# Patient Record
Sex: FEMALE | URBAN - METROPOLITAN AREA
[De-identification: names, ages, dates, MRNs, and addresses within clinical notes are randomized per-mention and may not be internally consistent; named-entity substitution may affect disease eponyms.]

---

## 2017-01-17 ENCOUNTER — EMERGENCY (EMERGENCY)
Facility: HOSPITAL | Age: 37
LOS: 1 days | Discharge: HOME | End: 2017-01-17
Attending: EMERGENCY MEDICINE | Admitting: EMERGENCY MEDICINE
Payer: COMMERCIAL

## 2017-01-17 VITALS
DIASTOLIC BLOOD PRESSURE: 102 MMHG | SYSTOLIC BLOOD PRESSURE: 142 MMHG | OXYGEN SATURATION: 100 % | RESPIRATION RATE: 16 BRPM | TEMPERATURE: 99 F | HEART RATE: 88 BPM

## 2017-01-17 VITALS
SYSTOLIC BLOOD PRESSURE: 146 MMHG | RESPIRATION RATE: 16 BRPM | OXYGEN SATURATION: 99 % | DIASTOLIC BLOOD PRESSURE: 74 MMHG | HEART RATE: 105 BPM

## 2017-01-17 PROCEDURE — 99284 EMERGENCY DEPT VISIT MOD MDM: CPT | Mod: 25

## 2017-01-17 PROCEDURE — 10061 I&D ABSCESS COMP/MULTIPLE: CPT

## 2017-01-17 RX ORDER — SODIUM CHLORIDE 9 MG/ML
1000 INJECTION INTRAMUSCULAR; INTRAVENOUS; SUBCUTANEOUS ONCE
Qty: 0 | Refills: 0 | Status: DISCONTINUED | OUTPATIENT
Start: 2017-01-17 | End: 2017-01-17

## 2017-01-17 RX ORDER — MORPHINE SULFATE 50 MG/1
4 CAPSULE, EXTENDED RELEASE ORAL ONCE
Qty: 0 | Refills: 0 | Status: DISCONTINUED | OUTPATIENT
Start: 2017-01-17 | End: 2017-01-17

## 2017-01-17 RX ADMIN — MORPHINE SULFATE 4 MILLIGRAM(S): 50 CAPSULE, EXTENDED RELEASE ORAL at 22:41

## 2017-01-17 RX ADMIN — MORPHINE SULFATE 4 MILLIGRAM(S): 50 CAPSULE, EXTENDED RELEASE ORAL at 23:23

## 2017-01-17 NOTE — ED ADULT TRIAGE NOTE - CHIEF COMPLAINT QUOTE
c/o gluteal abcess to right side drained 1 month ago, reinfected and started antibiotics 4 days ago.

## 2017-01-17 NOTE — ED PROVIDER NOTE - PROGRESS NOTE DETAILS
The scribe's documentation has been prepared under my direction and personally reviewed by me in its entirety. I confirm that the note above accurately reflects all work, treatment, procedures, and medical decision making performed by me.  LIONEL RAYA

## 2017-01-17 NOTE — ED ADULT NURSE NOTE - OBJECTIVE STATEMENT
pt was here about 3 weeks ago for I&D  of abcess. pt had sx consult 1/9 and pt developed pain in area 2 days after consult. states at that time the wound was open, states it closed up and today it drained minimal drainage after pt pressed on it. sl placed , medicated for I&D. Instructions given not to drive or drink alcohol after taking morphine and valium, verbalized understanding. Family here to drive patient home.

## 2017-01-17 NOTE — ED PROVIDER NOTE - ATTENDING CONTRIBUTION TO CARE
DR Bloch- 36 year old female with absceess, recurrent, on doxycycline, 2x3 cm induration with abscess pointing- I and d with large cavity extending to pilonidal area- irrigated packing placed.

## 2017-01-17 NOTE — ED PROVIDER NOTE - NS ED MD SCRIBE ATTENDING SCRIBE SECTIONS
VITAL SIGNS( Pullset)/DISPOSITION/PHYSICAL EXAM/HIV/HISTORY OF PRESENT ILLNESS/PAST MEDICAL/SURGICAL/SOCIAL HISTORY/REVIEW OF SYSTEMS

## 2017-03-01 ENCOUNTER — APPOINTMENT (OUTPATIENT)
Dept: PEDIATRIC SURGERY | Facility: CLINIC | Age: 37
End: 2017-03-01

## 2017-03-01 VITALS
WEIGHT: 182.54 LBS | SYSTOLIC BLOOD PRESSURE: 124 MMHG | HEART RATE: 81 BPM | DIASTOLIC BLOOD PRESSURE: 74 MMHG | HEIGHT: 65 IN | BODY MASS INDEX: 30.41 KG/M2

## 2017-06-01 ENCOUNTER — OUTPATIENT (OUTPATIENT)
Dept: OUTPATIENT SERVICES | Facility: HOSPITAL | Age: 37
LOS: 1 days | End: 2017-06-01

## 2017-06-01 VITALS
DIASTOLIC BLOOD PRESSURE: 72 MMHG | SYSTOLIC BLOOD PRESSURE: 108 MMHG | HEART RATE: 79 BPM | RESPIRATION RATE: 18 BRPM | WEIGHT: 177.03 LBS | TEMPERATURE: 98 F | OXYGEN SATURATION: 99 % | HEIGHT: 64.5 IN

## 2017-06-01 DIAGNOSIS — L98.8 OTHER SPECIFIED DISORDERS OF THE SKIN AND SUBCUTANEOUS TISSUE: ICD-10-CM

## 2017-06-01 DIAGNOSIS — Z87.2 PERSONAL HISTORY OF DISEASES OF THE SKIN AND SUBCUTANEOUS TISSUE: Chronic | ICD-10-CM

## 2017-06-01 LAB
BASOPHILS # BLD AUTO: 0.02 K/UL — SIGNIFICANT CHANGE UP (ref 0–0.2)
BASOPHILS NFR BLD AUTO: 0.4 % — SIGNIFICANT CHANGE UP (ref 0–2)
EOSINOPHIL # BLD AUTO: 0.12 K/UL — SIGNIFICANT CHANGE UP (ref 0–0.5)
EOSINOPHIL NFR BLD AUTO: 2.4 % — SIGNIFICANT CHANGE UP (ref 0–6)
HCT VFR BLD CALC: 43.7 % — SIGNIFICANT CHANGE UP (ref 34.5–45)
HGB BLD-MCNC: 14.2 G/DL — SIGNIFICANT CHANGE UP (ref 11.5–15.5)
IMM GRANULOCYTES NFR BLD AUTO: 0 % — SIGNIFICANT CHANGE UP (ref 0–1.5)
LYMPHOCYTES # BLD AUTO: 1.67 K/UL — SIGNIFICANT CHANGE UP (ref 1–3.3)
LYMPHOCYTES # BLD AUTO: 32.9 % — SIGNIFICANT CHANGE UP (ref 13–44)
MCHC RBC-ENTMCNC: 28.5 PG — SIGNIFICANT CHANGE UP (ref 27–34)
MCHC RBC-ENTMCNC: 32.5 % — SIGNIFICANT CHANGE UP (ref 32–36)
MCV RBC AUTO: 87.6 FL — SIGNIFICANT CHANGE UP (ref 80–100)
MONOCYTES # BLD AUTO: 0.37 K/UL — SIGNIFICANT CHANGE UP (ref 0–0.9)
MONOCYTES NFR BLD AUTO: 7.3 % — SIGNIFICANT CHANGE UP (ref 2–14)
NEUTROPHILS # BLD AUTO: 2.9 K/UL — SIGNIFICANT CHANGE UP (ref 1.8–7.4)
NEUTROPHILS NFR BLD AUTO: 57 % — SIGNIFICANT CHANGE UP (ref 43–77)
PLATELET # BLD AUTO: 235 K/UL — SIGNIFICANT CHANGE UP (ref 150–400)
PMV BLD: 11.8 FL — SIGNIFICANT CHANGE UP (ref 7–13)
RBC # BLD: 4.99 M/UL — SIGNIFICANT CHANGE UP (ref 3.8–5.2)
RBC # FLD: 13.6 % — SIGNIFICANT CHANGE UP (ref 10.3–14.5)
WBC # BLD: 5.08 K/UL — SIGNIFICANT CHANGE UP (ref 3.8–10.5)
WBC # FLD AUTO: 5.08 K/UL — SIGNIFICANT CHANGE UP (ref 3.8–10.5)

## 2017-06-01 RX ORDER — SODIUM CHLORIDE 9 MG/ML
1000 INJECTION, SOLUTION INTRAVENOUS
Qty: 0 | Refills: 0 | Status: DISCONTINUED | OUTPATIENT
Start: 2017-06-14 | End: 2017-06-29

## 2017-06-01 RX ORDER — SODIUM CHLORIDE 9 MG/ML
3 INJECTION INTRAMUSCULAR; INTRAVENOUS; SUBCUTANEOUS EVERY 8 HOURS
Qty: 0 | Refills: 0 | Status: DISCONTINUED | OUTPATIENT
Start: 2017-06-14 | End: 2017-06-29

## 2017-06-01 NOTE — H&P PST ADULT - NEGATIVE GASTROINTESTINAL SYMPTOMS
no nausea/no vomiting/no constipation/no abdominal pain/no hiccoughs/no melena/no change in bowel habits/no diarrhea

## 2017-06-01 NOTE — H&P PST ADULT - NECK DETAILS
no JVD/supple/normal thyroid gland supple/no JVD/decreased ROM on lateral bending/normal thyroid gland

## 2017-06-01 NOTE — H&P PST ADULT - NEGATIVE GENERAL GENITOURINARY SYMPTOMS
normal urinary frequency/no urine discoloration/no hematuria/no renal colic/no flank pain L/no bladder infections/no flank pain R

## 2017-06-01 NOTE — H&P PST ADULT - PROBLEM SELECTOR PLAN 1
Scheduled for examination under anesthesia, possible excision of pilonidal disease with trephination, possible excision of scar on 06/14/17. Pre op instructions, famotidine given and explained. Pt verbalized understanding.

## 2017-06-01 NOTE — H&P PST ADULT - HISTORY OF PRESENT ILLNESS
38 y/o  female  pilonidal cyst since 2008, flare up in 2016 36 y/o  female with no significant PMH: presents to PST for pre op evaluation with hx of pilonidal cyst since 2008. Pt stated that "it flared up in 2016", c/o discomfort when sitting too long. Now scheduled for examination under anesthesia, possible excision of pilonidal disease with trephination, possible excision of scar on 06/14/17.

## 2017-06-01 NOTE — H&P PST ADULT - RS GEN PE MLT RESP DETAILS PC
no intercostal retractions/no wheezes/airway patent/breath sounds equal/no chest wall tenderness/no rales/good air movement/no rhonchi/respirations non-labored/clear to auscultation bilaterally

## 2017-06-01 NOTE — H&P PST ADULT - FAMILY HISTORY
Father  Still living? Yes, Estimated age: 61-70  Family history of CABG, Age at diagnosis: Age Unknown     Mother  Still living? Yes, Estimated age: 61-70  Family history of GERD, Age at diagnosis: Age Unknown     Sibling  Still living? Yes, Estimated age: 31-40  Type 1 diabetes mellitus, Age at diagnosis: Age Unknown

## 2017-06-01 NOTE — H&P PST ADULT - REASON FOR ADMISSION
" I have pilonidal cyst, he's going to remove it and do exploratory procedure" " I have pilonidal cyst, he's going to remove it and do an exploratory procedure"

## 2017-06-01 NOTE — H&P PST ADULT - NSANTHOSAYNRD_GEN_A_CORE
No. DION screening performed.  STOP BANG Legend: 0-2 = LOW Risk; 3-4 = INTERMEDIATE Risk; 5-8 = HIGH Risk

## 2017-06-01 NOTE — H&P PST ADULT - NEGATIVE NEUROLOGICAL SYMPTOMS
no focal seizures/no transient paralysis/no weakness/no loss of sensation/no confusion/no hemiparesis/no paresthesias/no generalized seizures/no vertigo/no syncope/no tremors/no loss of consciousness/no difficulty walking

## 2017-06-01 NOTE — H&P PST ADULT - ATTENDING COMMENTS
TRACEY JOHNS is a 37y girl here for eua and excision of pilonidal disease   I have discussed all of the risks benefits and alternatives of the procedure including but not limited to bleeding, infection, poor wound healing and recurrence.  Consent is signed and on chart.

## 2017-06-01 NOTE — H&P PST ADULT - NEGATIVE CARDIOVASCULAR SYMPTOMS
no chest pain/no orthopnea/no paroxysmal nocturnal dyspnea/no palpitations/no claudication/no dyspnea on exertion

## 2017-06-01 NOTE — H&P PST ADULT - PMH
Migraine with aura Migraine with aura    Other specified disorders of the skin and subcutaneous tissue

## 2017-06-14 ENCOUNTER — OUTPATIENT (OUTPATIENT)
Dept: OUTPATIENT SERVICES | Facility: HOSPITAL | Age: 37
LOS: 1 days | Discharge: HOME | End: 2017-06-14
Payer: COMMERCIAL

## 2017-06-14 ENCOUNTER — TRANSCRIPTION ENCOUNTER (OUTPATIENT)
Age: 37
End: 2017-06-14

## 2017-06-14 VITALS
RESPIRATION RATE: 16 BRPM | WEIGHT: 177.03 LBS | DIASTOLIC BLOOD PRESSURE: 76 MMHG | HEART RATE: 89 BPM | OXYGEN SATURATION: 100 % | TEMPERATURE: 99 F | SYSTOLIC BLOOD PRESSURE: 127 MMHG | HEIGHT: 64.5 IN

## 2017-06-14 VITALS
HEART RATE: 72 BPM | RESPIRATION RATE: 16 BRPM | SYSTOLIC BLOOD PRESSURE: 108 MMHG | OXYGEN SATURATION: 98 % | DIASTOLIC BLOOD PRESSURE: 66 MMHG

## 2017-06-14 DIAGNOSIS — L98.8 OTHER SPECIFIED DISORDERS OF THE SKIN AND SUBCUTANEOUS TISSUE: ICD-10-CM

## 2017-06-14 DIAGNOSIS — Z87.2 PERSONAL HISTORY OF DISEASES OF THE SKIN AND SUBCUTANEOUS TISSUE: Chronic | ICD-10-CM

## 2017-06-14 LAB — HCG UR QL: NEGATIVE — SIGNIFICANT CHANGE UP

## 2017-06-14 PROCEDURE — 11772 EXC PILONIDAL CYST COMP: CPT

## 2017-06-14 RX ORDER — LORATADINE 10 MG/1
10 TABLET ORAL DAILY
Qty: 0 | Refills: 0 | Status: DISCONTINUED | OUTPATIENT
Start: 2017-06-14 | End: 2017-06-29

## 2017-06-14 RX ORDER — SODIUM CHLORIDE 9 MG/ML
1000 INJECTION, SOLUTION INTRAVENOUS
Qty: 0 | Refills: 0 | Status: DISCONTINUED | OUTPATIENT
Start: 2017-06-14 | End: 2017-06-14

## 2017-06-14 NOTE — ASU DISCHARGE PLAN (ADULT/PEDIATRIC). - NOTIFY
Bleeding that does not stop/Fever greater than 101/Unable to Urinate Numbness, color, or temperature change to extremity/Pain not relieved by Medications/Fever greater than 101/Bleeding that does not stop/Persistent Nausea and Vomiting/Unable to Urinate

## 2017-06-14 NOTE — ASU DISCHARGE PLAN (ADULT/PEDIATRIC). - ITEMS TO FOLLOWUP WITH YOUR PHYSICIAN'S
Please follow up with Dr. Alcantara in one to two weeks. Call the office to make an appointment. Follow the instructions sheet given to you or your family member. You can remove the dressing and shower. Take tylenol or motrin for pain or percocet if you need it. Do not drive while taking pain medications.

## 2017-06-14 NOTE — ASU DISCHARGE PLAN (ADULT/PEDIATRIC). - NURSING INSTRUCTIONS
Please report any signs and symptoms of infection including Fever (Temp >101 or >100.4 if GYN procedure), uncontrollable nausea, vomiting, diarrhea, chills & inability to urinate. Shower with soap & water when appropriate, pat dry with clean towel & no ointments, creams, powders or lotions on incisions unless okayed by MD. Please report any puss or increased drainage from incision sites, or if redness develops and spreads around sites. Please practice good hand hygiene especially after using the bathroom. Follow up with all MD appointments and take medication(s) as prescribed

## 2017-06-14 NOTE — ASU DISCHARGE PLAN (ADULT/PEDIATRIC). - MEDICATION SUMMARY - MEDICATIONS TO TAKE
I will START or STAY ON the medications listed below when I get home from the hospital:    oxycodone-acetaminophen 5mg-325mg oral tablet  -- 1 tab(s) by mouth every 4 hours, As Needed -for severe pain MDD:6  -- Caution federal law prohibits the transfer of this drug to any person other  than the person for whom it was prescribed.  May cause drowsiness.  Alcohol may intensify this effect.  Use care when operating dangerous machinery.  This prescription cannot be refilled.  This product contains acetaminophen.  Do not use  with any other product containing acetaminophen to prevent possible liver damage.  Using more of this medication than prescribed may cause serious breathing problems.    -- Indication: For severe pain    naproxen 250 mg oral tablet  -- 1 tab(s) by mouth 2 times a day, As Needed LD 6/7/17  -- Indication: For pain    loratadine 10 mg oral tablet  -- 1 tab(s) by mouth once a day  -- Indication: For allergies    Vitamin D3 5000 intl units oral tablet  -- 1 tab(s) by mouth twice a week  -- Indication: For supplement

## 2017-06-27 ENCOUNTER — APPOINTMENT (OUTPATIENT)
Dept: PEDIATRIC SURGERY | Facility: CLINIC | Age: 37
End: 2017-06-27

## 2017-07-12 ENCOUNTER — CHART COPY (OUTPATIENT)
Age: 37
End: 2017-07-12

## 2017-07-31 ENCOUNTER — APPOINTMENT (OUTPATIENT)
Dept: PEDIATRIC SURGERY | Facility: CLINIC | Age: 37
End: 2017-07-31
Payer: COMMERCIAL

## 2017-07-31 VITALS — WEIGHT: 182.1 LBS | BODY MASS INDEX: 30.3 KG/M2 | TEMPERATURE: 98.6 F

## 2017-07-31 PROCEDURE — 99024 POSTOP FOLLOW-UP VISIT: CPT

## 2017-08-01 RX ORDER — CLINDAMYCIN HYDROCHLORIDE 300 MG/1
300 CAPSULE ORAL EVERY 6 HOURS
Qty: 56 | Refills: 0 | Status: ACTIVE | COMMUNITY
Start: 2017-08-01 | End: 1900-01-01

## 2017-08-01 RX ORDER — CLINDAMYCIN HYDROCHLORIDE 300 MG/1
300 CAPSULE ORAL 3 TIMES DAILY
Qty: 42 | Refills: 0 | Status: DISCONTINUED | COMMUNITY
Start: 2017-07-31 | End: 2017-08-01

## 2017-09-16 ENCOUNTER — TRANSCRIPTION ENCOUNTER (OUTPATIENT)
Age: 37
End: 2017-09-16

## 2017-10-26 ENCOUNTER — OUTPATIENT (OUTPATIENT)
Dept: OUTPATIENT SERVICES | Facility: HOSPITAL | Age: 37
LOS: 1 days | End: 2017-10-26

## 2017-10-26 VITALS
HEIGHT: 65 IN | DIASTOLIC BLOOD PRESSURE: 80 MMHG | HEART RATE: 67 BPM | SYSTOLIC BLOOD PRESSURE: 120 MMHG | TEMPERATURE: 97 F | WEIGHT: 184.09 LBS

## 2017-10-26 DIAGNOSIS — L98.8 OTHER SPECIFIED DISORDERS OF THE SKIN AND SUBCUTANEOUS TISSUE: ICD-10-CM

## 2017-10-26 DIAGNOSIS — Z87.2 PERSONAL HISTORY OF DISEASES OF THE SKIN AND SUBCUTANEOUS TISSUE: Chronic | ICD-10-CM

## 2017-10-26 DIAGNOSIS — L05.91 PILONIDAL CYST WITHOUT ABSCESS: ICD-10-CM

## 2017-10-26 LAB
BASOPHILS # BLD AUTO: 0.05 K/UL — SIGNIFICANT CHANGE UP (ref 0–0.2)
BASOPHILS NFR BLD AUTO: 0.9 % — SIGNIFICANT CHANGE UP (ref 0–2)
EOSINOPHIL # BLD AUTO: 0.13 K/UL — SIGNIFICANT CHANGE UP (ref 0–0.5)
EOSINOPHIL NFR BLD AUTO: 2.5 % — SIGNIFICANT CHANGE UP (ref 0–6)
HCT VFR BLD CALC: 44.3 % — SIGNIFICANT CHANGE UP (ref 34.5–45)
HGB BLD-MCNC: 14.2 G/DL — SIGNIFICANT CHANGE UP (ref 11.5–15.5)
IMM GRANULOCYTES # BLD AUTO: 0.01 # — SIGNIFICANT CHANGE UP
IMM GRANULOCYTES NFR BLD AUTO: 0.2 % — SIGNIFICANT CHANGE UP (ref 0–1.5)
LYMPHOCYTES # BLD AUTO: 1.88 K/UL — SIGNIFICANT CHANGE UP (ref 1–3.3)
LYMPHOCYTES # BLD AUTO: 35.7 % — SIGNIFICANT CHANGE UP (ref 13–44)
MCHC RBC-ENTMCNC: 28.5 PG — SIGNIFICANT CHANGE UP (ref 27–34)
MCHC RBC-ENTMCNC: 32.1 % — SIGNIFICANT CHANGE UP (ref 32–36)
MCV RBC AUTO: 88.8 FL — SIGNIFICANT CHANGE UP (ref 80–100)
MONOCYTES # BLD AUTO: 0.44 K/UL — SIGNIFICANT CHANGE UP (ref 0–0.9)
MONOCYTES NFR BLD AUTO: 8.3 % — SIGNIFICANT CHANGE UP (ref 2–14)
NEUTROPHILS # BLD AUTO: 2.76 K/UL — SIGNIFICANT CHANGE UP (ref 1.8–7.4)
NEUTROPHILS NFR BLD AUTO: 52.4 % — SIGNIFICANT CHANGE UP (ref 43–77)
NRBC # FLD: 0 — SIGNIFICANT CHANGE UP
PLATELET # BLD AUTO: 286 K/UL — SIGNIFICANT CHANGE UP (ref 150–400)
PMV BLD: 11.7 FL — SIGNIFICANT CHANGE UP (ref 7–13)
RBC # BLD: 4.99 M/UL — SIGNIFICANT CHANGE UP (ref 3.8–5.2)
RBC # FLD: 12.9 % — SIGNIFICANT CHANGE UP (ref 10.3–14.5)
WBC # BLD: 5.27 K/UL — SIGNIFICANT CHANGE UP (ref 3.8–10.5)
WBC # FLD AUTO: 5.27 K/UL — SIGNIFICANT CHANGE UP (ref 3.8–10.5)

## 2017-10-26 RX ORDER — CHOLECALCIFEROL (VITAMIN D3) 125 MCG
1 CAPSULE ORAL
Qty: 0 | Refills: 0 | COMMUNITY

## 2017-10-26 RX ORDER — SODIUM CHLORIDE 9 MG/ML
1000 INJECTION, SOLUTION INTRAVENOUS
Qty: 0 | Refills: 0 | Status: DISCONTINUED | OUTPATIENT
Start: 2017-11-10 | End: 2017-11-10

## 2017-10-26 NOTE — H&P PST ADULT - SKIN/BREAST COMMENTS
s/p minimal excision pilonidal cyst in June 2017. Pt reports 2 flair-ups since that time, most recently July 2017. Now scheduled for Re-excision of Pilonidal disease with Trephination 11/0/17.

## 2017-10-26 NOTE — H&P PST ADULT - PMH
Migraine with aura    Other specified disorders of the skin and subcutaneous tissue Migraine with aura    Obesity    Other specified disorders of the skin and subcutaneous tissue    Pilonidal cyst

## 2017-10-26 NOTE — H&P PST ADULT - PROBLEM SELECTOR PLAN 1
Re-excision of Pilonidal disease with Trephination 11/0/17.     CBC    Antibacterial soap given and explained. Pre-op instructions given and explained

## 2017-10-26 NOTE — H&P PST ADULT - ASSESSMENT
36 y/o female w s/p minimal excision pilonidal cyst in June 2017. Pt reports 2 flair-ups since that time, most recently July 2017. Now scheduled for Re-excision of Pilonidal disease with Trephination 11/0/17.

## 2017-10-26 NOTE — H&P PST ADULT - HISTORY OF PRESENT ILLNESS
38 y/o  female with no significant s/p minial excision pilonadial surgery in June 2017, 2 exicision in drainage surgeries. Since surgery reports 2 flair-ups PMH: presents to PST for pre op evaluation with hx of pilonidal cyst since 2008. Pt stated that "it flared up in 2016", c/o discomfort when sitting too long. Now scheduled for examination under anesthesia, possible excision of pilonidal disease with trephination, possible excision of scar on 06/14/17. 36 y/o female w s/p minimal excision pilonidal cyst in June 2017. Pt reports 2 flair-ups since that time, most recently July 2017. Now scheduled for Re-excision of Pilonidal disease with Trephination 11/0/17.

## 2017-11-09 NOTE — ASU PATIENT PROFILE, ADULT - PMH
Migraine with aura    Obesity    Other specified disorders of the skin and subcutaneous tissue    Pilonidal cyst

## 2017-11-10 ENCOUNTER — OUTPATIENT (OUTPATIENT)
Dept: OUTPATIENT SERVICES | Facility: HOSPITAL | Age: 37
LOS: 1 days | Discharge: HOME | End: 2017-11-10
Payer: COMMERCIAL

## 2017-11-10 ENCOUNTER — RESULT REVIEW (OUTPATIENT)
Age: 37
End: 2017-11-10

## 2017-11-10 VITALS
SYSTOLIC BLOOD PRESSURE: 113 MMHG | OXYGEN SATURATION: 98 % | RESPIRATION RATE: 17 BRPM | HEART RATE: 82 BPM | DIASTOLIC BLOOD PRESSURE: 72 MMHG

## 2017-11-10 VITALS
SYSTOLIC BLOOD PRESSURE: 134 MMHG | HEART RATE: 83 BPM | WEIGHT: 184.09 LBS | TEMPERATURE: 99 F | OXYGEN SATURATION: 99 % | RESPIRATION RATE: 17 BRPM | HEIGHT: 65 IN | DIASTOLIC BLOOD PRESSURE: 72 MMHG

## 2017-11-10 DIAGNOSIS — L98.8 OTHER SPECIFIED DISORDERS OF THE SKIN AND SUBCUTANEOUS TISSUE: ICD-10-CM

## 2017-11-10 DIAGNOSIS — Z87.2 PERSONAL HISTORY OF DISEASES OF THE SKIN AND SUBCUTANEOUS TISSUE: Chronic | ICD-10-CM

## 2017-11-10 LAB — HCG UR QL: NEGATIVE — SIGNIFICANT CHANGE UP

## 2017-11-10 PROCEDURE — 11772 EXC PILONIDAL CYST COMP: CPT

## 2017-11-10 PROCEDURE — 88304 TISSUE EXAM BY PATHOLOGIST: CPT | Mod: 26

## 2017-11-10 RX ORDER — LORATADINE 10 MG/1
1 TABLET ORAL
Qty: 0 | Refills: 0 | COMMUNITY

## 2017-11-10 RX ORDER — FENTANYL CITRATE 50 UG/ML
25 INJECTION INTRAVENOUS
Qty: 0 | Refills: 0 | Status: DISCONTINUED | OUTPATIENT
Start: 2017-11-10 | End: 2017-11-10

## 2017-11-10 RX ORDER — ONDANSETRON 8 MG/1
4 TABLET, FILM COATED ORAL ONCE
Qty: 0 | Refills: 0 | Status: DISCONTINUED | OUTPATIENT
Start: 2017-11-10 | End: 2017-11-25

## 2017-11-10 RX ORDER — FENTANYL CITRATE 50 UG/ML
50 INJECTION INTRAVENOUS
Qty: 0 | Refills: 0 | Status: DISCONTINUED | OUTPATIENT
Start: 2017-11-10 | End: 2017-11-10

## 2017-11-10 RX ORDER — ACETAMINOPHEN 500 MG
1000 TABLET ORAL ONCE
Qty: 0 | Refills: 0 | Status: COMPLETED | OUTPATIENT
Start: 2017-11-10 | End: 2017-11-10

## 2017-11-10 RX ADMIN — SODIUM CHLORIDE 30 MILLILITER(S): 9 INJECTION, SOLUTION INTRAVENOUS at 14:06

## 2017-11-10 RX ADMIN — Medication 400 MILLIGRAM(S): at 17:46

## 2017-11-10 NOTE — BRIEF OPERATIVE NOTE - PROCEDURE
<<-----Click on this checkbox to enter Procedure Pilonidal cyst excision  11/10/2017    Active  [id number]

## 2017-11-10 NOTE — ASU DISCHARGE PLAN (ADULT/PEDIATRIC). - SPECIAL INSTRUCTIONS
DO NOT take any Tylenol (Acetaminophen) or narcotics containing Tylenol until after  1:45 am 11/11/17 . You received Tylenol during your operation and it can cause damage to your liver if too much is taken within a 24 hour time period.

## 2017-11-10 NOTE — ASU DISCHARGE PLAN (ADULT/PEDIATRIC). - NOTIFY
Persistent Nausea and Vomiting/Pain not relieved by Medications/Swelling that continues/Fever greater than 101/Numbness, color, or temperature change to extremity/GYN Fever>100.4/Numbness, tingling/Bleeding that does not stop/Unable to Urinate

## 2017-11-13 LAB — SURGICAL PATHOLOGY STUDY: SIGNIFICANT CHANGE UP

## 2017-11-20 ENCOUNTER — TRANSCRIPTION ENCOUNTER (OUTPATIENT)
Age: 37
End: 2017-11-20

## 2017-11-27 ENCOUNTER — APPOINTMENT (OUTPATIENT)
Dept: PEDIATRIC SURGERY | Facility: CLINIC | Age: 37
End: 2017-11-27
Payer: COMMERCIAL

## 2017-11-27 VITALS
BODY MASS INDEX: 31.7 KG/M2 | TEMPERATURE: 98.6 F | HEART RATE: 72 BPM | SYSTOLIC BLOOD PRESSURE: 122 MMHG | DIASTOLIC BLOOD PRESSURE: 73 MMHG | WEIGHT: 190.48 LBS

## 2017-11-27 DIAGNOSIS — L98.8 OTHER SPECIFIED DISORDERS OF THE SKIN AND SUBCUTANEOUS TISSUE: ICD-10-CM

## 2017-11-27 PROCEDURE — 99024 POSTOP FOLLOW-UP VISIT: CPT

## 2021-07-07 NOTE — ED PROVIDER NOTE - SEVERITY
Quality 226: Preventive Care And Screening: Tobacco Use: Screening And Cessation Intervention: Patient screened for tobacco use and is an ex/non-smoker Quality 431: Preventive Care And Screening: Unhealthy Alcohol Use - Screening: Patient screened for unhealthy alcohol use using a single question and scores less than 2 times per year Quality 130: Documentation Of Current Medications In The Medical Record: Current Medications Documented Detail Level: Detailed PAIN SCALE 10 OF 10.

## 2021-10-15 ENCOUNTER — TRANSCRIPTION ENCOUNTER (OUTPATIENT)
Age: 41
End: 2021-10-15

## 2022-11-07 NOTE — ASU PREOP CHECKLIST - 2.
70 y/o female c/o intermittent right temporal lobe headaches accompanied with blurred vision x1 month. pt stated last episode was yesterday. mom in waiting room

## 2023-05-31 PROBLEM — E66.9 OBESITY, UNSPECIFIED: Chronic | Status: ACTIVE | Noted: 2017-10-26

## 2023-05-31 PROBLEM — G43.109 MIGRAINE WITH AURA, NOT INTRACTABLE, WITHOUT STATUS MIGRAINOSUS: Chronic | Status: ACTIVE | Noted: 2017-06-01

## 2023-05-31 PROBLEM — L05.91 PILONIDAL CYST WITHOUT ABSCESS: Chronic | Status: ACTIVE | Noted: 2017-10-26

## 2023-05-31 PROBLEM — L98.8 OTHER SPECIFIED DISORDERS OF THE SKIN AND SUBCUTANEOUS TISSUE: Chronic | Status: ACTIVE | Noted: 2017-06-01

## 2023-07-06 ENCOUNTER — APPOINTMENT (OUTPATIENT)
Dept: INTERNAL MEDICINE | Facility: CLINIC | Age: 43
End: 2023-07-06

## 2023-07-27 ENCOUNTER — APPOINTMENT (OUTPATIENT)
Dept: INTERNAL MEDICINE | Facility: CLINIC | Age: 43
End: 2023-07-27
Payer: MEDICAID

## 2023-07-27 VITALS
OXYGEN SATURATION: 100 % | WEIGHT: 209 LBS | DIASTOLIC BLOOD PRESSURE: 85 MMHG | HEIGHT: 65 IN | HEART RATE: 76 BPM | BODY MASS INDEX: 34.82 KG/M2 | SYSTOLIC BLOOD PRESSURE: 131 MMHG

## 2023-07-27 DIAGNOSIS — Z82.3 FAMILY HISTORY OF STROKE: ICD-10-CM

## 2023-07-27 DIAGNOSIS — E55.9 VITAMIN D DEFICIENCY, UNSPECIFIED: ICD-10-CM

## 2023-07-27 DIAGNOSIS — Z84.2 FAMILY HISTORY OF OTHER DISEASES OF THE GENITOURINARY SYSTEM: ICD-10-CM

## 2023-07-27 DIAGNOSIS — E28.2 POLYCYSTIC OVARIAN SYNDROME: ICD-10-CM

## 2023-07-27 DIAGNOSIS — Z12.83 ENCOUNTER FOR SCREENING FOR MALIGNANT NEOPLASM OF SKIN: ICD-10-CM

## 2023-07-27 DIAGNOSIS — Z81.8 FAMILY HISTORY OF OTHER MENTAL AND BEHAVIORAL DISORDERS: ICD-10-CM

## 2023-07-27 DIAGNOSIS — M76.60 ACHILLES TENDINITIS, UNSPECIFIED LEG: ICD-10-CM

## 2023-07-27 DIAGNOSIS — Z00.00 ENCOUNTER FOR GENERAL ADULT MEDICAL EXAMINATION W/OUT ABNORMAL FINDINGS: ICD-10-CM

## 2023-07-27 DIAGNOSIS — Z82.49 FAMILY HISTORY OF ISCHEMIC HEART DISEASE AND OTHER DISEASES OF THE CIRCULATORY SYSTEM: ICD-10-CM

## 2023-07-27 DIAGNOSIS — L05.91 PILONIDAL CYST W/OUT ABSCESS: ICD-10-CM

## 2023-07-27 DIAGNOSIS — F41.9 ANXIETY DISORDER, UNSPECIFIED: ICD-10-CM

## 2023-07-27 DIAGNOSIS — Z83.3 FAMILY HISTORY OF DIABETES MELLITUS: ICD-10-CM

## 2023-07-27 DIAGNOSIS — Z84.0 FAMILY HISTORY OF DISEASES OF THE SKIN AND SUBCUTANEOUS TISSUE: ICD-10-CM

## 2023-07-27 DIAGNOSIS — Z87.891 PERSONAL HISTORY OF NICOTINE DEPENDENCE: ICD-10-CM

## 2023-07-27 DIAGNOSIS — Z23 ENCOUNTER FOR IMMUNIZATION: ICD-10-CM

## 2023-07-27 DIAGNOSIS — F32.A DEPRESSION, UNSPECIFIED: ICD-10-CM

## 2023-07-27 PROCEDURE — 90715 TDAP VACCINE 7 YRS/> IM: CPT

## 2023-07-27 PROCEDURE — 90471 IMMUNIZATION ADMIN: CPT

## 2023-07-27 PROCEDURE — 99386 PREV VISIT NEW AGE 40-64: CPT | Mod: 25

## 2023-07-27 PROCEDURE — 99203 OFFICE O/P NEW LOW 30 MIN: CPT | Mod: 25

## 2023-07-27 NOTE — HEALTH RISK ASSESSMENT
[Patient reported mammogram was normal] : Patient reported mammogram was normal [Patient reported PAP Smear was normal] : Patient reported PAP Smear was normal [MammogramDate] : 09/22 [PapSmearDate] : 05/23

## 2023-07-27 NOTE — HISTORY OF PRESENT ILLNESS
[FreeTextEntry1] : annual. concerns [de-identified] : 44 yo F with PCOS, anxiety/depression presents for annual. \par \par gained a lot of weight, interested in going back to gym but living with father who is immunocompromised. Causing a lot of anxiety.\par \par + dry eyes, skin itchy\par fragrance sensitivity - gets migraines with auras. Migraines are atypical-- numbness/tingling/slurring of speech just prior to migraine headache. Mainly with fragrances. Sometimes with menstrual cycle.\par \par was very active pre-covid. Now doing some exercise but not as much as she used to. Both calves get very tight. Ankle/feet pain-- feels toes getting squished even without shoes on. \par \par

## 2023-08-01 ENCOUNTER — APPOINTMENT (OUTPATIENT)
Dept: PAIN MANAGEMENT | Facility: CLINIC | Age: 43
End: 2023-08-01
Payer: MEDICAID

## 2023-08-01 VITALS
DIASTOLIC BLOOD PRESSURE: 78 MMHG | HEIGHT: 65 IN | HEART RATE: 69 BPM | SYSTOLIC BLOOD PRESSURE: 116 MMHG | WEIGHT: 209 LBS | BODY MASS INDEX: 34.82 KG/M2

## 2023-08-01 PROCEDURE — 99205 OFFICE O/P NEW HI 60 MIN: CPT

## 2023-08-01 NOTE — HISTORY OF PRESENT ILLNESS
[FreeTextEntry1] : This is a case of a  43  -year-old right-handed female with a past medical history of "subluxed vertebrae 10 years ago during delivery"   who presents with a chief complaint of HEADACHES since teenager thought to be hormonal in nature occurring almost monthy. In 2010 described " a cervical subluxation of neck"  the headaches increased to almost daily requiring massage therapy, chiropractic with minimal relief.  The headaches are   #1 constant right sided >>> left sided neck discomfort described as a tense sensation followed by headaches and forehead worsened by movement. Associated with nausea, photto and phonophobia Uses a foam roller on the neck. Applies a progesterone cream to help relax muscle along with a heating pad.   #2 -Often triggered by synthetic fraguances, patient will have super debilitating headache preceded by right arm and left tingling extending to the left side, involving the tongue, followed by language disturbances including 'words: dont make sense" with paraphasic errors, ie talking about regrigerator but speaks [employer] machine. These symptoms last 10 min to 30 minutes followed by a debilitating headache. She notes severe associated anxiety. She notes her son does not recognize his mother having any language issues during the actual event.   Patient has not been prescribed anything for the headaches.  No imaging of brain. MRI c spine was performed years ago.      Associated symptoms: Nausea, vomiting, photophobia, phonophobia, aura.  Patient describes the aura described as  Sleep per night - hours Caffeine intake/day -   Triggers: None Comorbidities: None Other pain conditions: None Imaging:MRI c spine NA. Medications: None prescribed CAM therapies: Massage and acupuncture with mininmal relief.  SHX: Unemployed worked as a[employer].  2013 - one child 12 years  Living on credit cards and savings. No tobacco or cannabis  Family history: Sister has migraines Allergies: NKA

## 2023-08-01 NOTE — PHYSICAL EXAM
[FreeTextEntry1] : Constitutional: No signs of distress or signs of toxicity.  Mental Status: Alert and well oriented. Speech fluent. No aphasia. Fund of knowledge intact.  Psychiatric: Mood stable. Cranial Nerve: PERRLA: No papilledema; No VFC: No Jessica. V1-3 intact. No facial asymmetry, hearing grossly intact; palate elevates symmetrically, tongue midline Motor:No involuntary movements noted.  Adequate bulk, tone throughout, 5/5 strength of all muscle groups  DTR: present and symmetrical; no clonus, plantars  downgoing Sensory: intact to primary and secondary modalities; neg Romberg Cerebellar: adequate finger to nose and heel to shin bilaterally. Gait: non antalgic or ataxic. Eyes: no redness or swelling HEENT: intact; no signs of trauma. Neck: No masses noted Pulmonary: no respiratory distress Vascular: no temperature, color change or sudomotor changes.; no edema Musculoskeletal: examination of the cervical spine reveals no midline tenderness, range of motion full upon flexion, extension and lateral rotation. Negative facet tenderness, Negative Spurlings bilaterally. examination of the lumbar spine reveals no midline or paraspinal tenderness; Range of motion full upon flexion, extension and lateral rotation; negative facet loading, No tenderness of sciatic notch, No tenderness of bilateral greater trochanters, Negative HERNESTO, negative SLRT bilaterally, Skin: No rash.

## 2023-08-01 NOTE — ASSESSMENT
[FreeTextEntry1] : Chronic migraine ? with sensory aura Cervicogenic component Possible right sided occipital neuralgia Severe osmophobia  Will obtain MRI brain ro structural pathology. Magnesium glyconate 400 mgs qhs. Advised of risks and benefits Consider occipital nerve block.  I have discussed extensively the potential causes of her migraine headaches emphasizing the importance of lifestyle modifications including diet, sleep hygiene, exercise, hydration, avoidance of potential triggers, limiting ETOH consumption and caffeine, I also discussed the role of non pharmacological interventions including herbal, vitamin and mineral supplementation, mindfulness meditation, breathing exercises, therapeutic massage, acupuncture and CBT.  The patient had ample opportunity to ask questions and to provide feedback.  All questions were answered and patient understood the goals of our treatment plan.

## 2023-08-04 ENCOUNTER — TRANSCRIPTION ENCOUNTER (OUTPATIENT)
Age: 43
End: 2023-08-04

## 2023-08-04 LAB
25(OH)D3 SERPL-MCNC: 34 NG/ML
ALBUMIN SERPL ELPH-MCNC: 4.6 G/DL
ALP BLD-CCNC: 64 U/L
ALT SERPL-CCNC: 16 U/L
ANION GAP SERPL CALC-SCNC: 13 MMOL/L
AST SERPL-CCNC: 16 U/L
BILIRUB SERPL-MCNC: 0.5 MG/DL
BUN SERPL-MCNC: 11 MG/DL
CALCIUM SERPL-MCNC: 9.7 MG/DL
CHLORIDE SERPL-SCNC: 104 MMOL/L
CHOLEST SERPL-MCNC: 201 MG/DL
CO2 SERPL-SCNC: 25 MMOL/L
CREAT SERPL-MCNC: 0.6 MG/DL
EGFR: 114 ML/MIN/1.73M2
ESTIMATED AVERAGE GLUCOSE: 120 MG/DL
GLUCOSE SERPL-MCNC: 105 MG/DL
HBA1C MFR BLD HPLC: 5.8 %
HBV SURFACE AB SERPL IA-ACNC: <3 MIU/ML
HDLC SERPL-MCNC: 48 MG/DL
LDLC SERPL CALC-MCNC: 129 MG/DL
NONHDLC SERPL-MCNC: 153 MG/DL
POTASSIUM SERPL-SCNC: 4.1 MMOL/L
PROT SERPL-MCNC: 7 G/DL
SODIUM SERPL-SCNC: 141 MMOL/L
TRIGL SERPL-MCNC: 138 MG/DL
TSH SERPL-ACNC: 1.51 UIU/ML
VIT B12 SERPL-MCNC: 599 PG/ML

## 2023-08-08 ENCOUNTER — APPOINTMENT (OUTPATIENT)
Dept: MRI IMAGING | Facility: HOSPITAL | Age: 43
End: 2023-08-08
Payer: MEDICAID

## 2023-08-08 ENCOUNTER — OUTPATIENT (OUTPATIENT)
Dept: OUTPATIENT SERVICES | Facility: HOSPITAL | Age: 43
LOS: 1 days | End: 2023-08-08
Payer: MEDICAID

## 2023-08-08 DIAGNOSIS — Z87.2 PERSONAL HISTORY OF DISEASES OF THE SKIN AND SUBCUTANEOUS TISSUE: Chronic | ICD-10-CM

## 2023-08-08 DIAGNOSIS — F41.9 ANXIETY DISORDER, UNSPECIFIED: ICD-10-CM

## 2023-08-08 PROCEDURE — 70551 MRI BRAIN STEM W/O DYE: CPT | Mod: 26

## 2023-08-08 PROCEDURE — 70551 MRI BRAIN STEM W/O DYE: CPT

## 2023-08-10 ENCOUNTER — APPOINTMENT (OUTPATIENT)
Dept: PAIN MANAGEMENT | Facility: CLINIC | Age: 43
End: 2023-08-10
Payer: MEDICAID

## 2023-08-10 VITALS
WEIGHT: 209 LBS | HEART RATE: 75 BPM | DIASTOLIC BLOOD PRESSURE: 75 MMHG | BODY MASS INDEX: 34.82 KG/M2 | SYSTOLIC BLOOD PRESSURE: 112 MMHG | HEIGHT: 65 IN

## 2023-08-10 PROCEDURE — 99214 OFFICE O/P EST MOD 30 MIN: CPT

## 2023-08-10 RX ORDER — GABAPENTIN 100 MG/1
100 CAPSULE ORAL AT BEDTIME
Qty: 30 | Refills: 0 | Status: ACTIVE | COMMUNITY
Start: 2023-08-10 | End: 1900-01-01

## 2023-08-10 NOTE — PHYSICAL EXAM
[FreeTextEntry1] : Constitutional: No signs of distress or signs of toxicity.  Mental Status: Alert and well oriented. Speech fluent. No aphasia. Fund of knowledge intact.  Psychiatric: Mood stable. Gait: non antalgic or ataxic. Eyes: no redness or swelling HEENT: intact; no signs of trauma. Neck: No masses noted Pulmonary: no respiratory distress Skin: No rash. Sensory: No allodynia. No sensory loss

## 2023-08-10 NOTE — ASSESSMENT
[FreeTextEntry1] : Chronic occipital neuralgia. Reviewed MRI brain with patient. Noncontributory.  Chronic intermittent dysesthesias. Non focal exam   All above sxs improved with magnesium 400 mgs qhs Will continue magnesium, hold nerve block procedure for now and implement gabapentin 100 mgs qhs. Advised of potential AEs.  Continue PT as directed.  Patient had ample time to ask questions and was satisfied with my responses.

## 2023-08-10 NOTE — HISTORY OF PRESENT ILLNESS
[FreeTextEntry1] : Since last visit, patient reports chronic intermittent burning of upper and lower extremities for 2-3 years. This pain is similar pain in head that was discussed on last visit. No discoloration, temperature changes or weakness, No BB.  Since taking Magnesium notes a 25 % reduction in above scs Also sleep has improved.

## 2023-08-31 ENCOUNTER — APPOINTMENT (OUTPATIENT)
Dept: INTERNAL MEDICINE | Facility: CLINIC | Age: 43
End: 2023-08-31

## 2023-09-15 ENCOUNTER — APPOINTMENT (OUTPATIENT)
Dept: PAIN MANAGEMENT | Facility: CLINIC | Age: 43
End: 2023-09-15
Payer: MEDICAID

## 2023-09-15 VITALS
WEIGHT: 209 LBS | BODY MASS INDEX: 34.82 KG/M2 | HEIGHT: 65 IN | SYSTOLIC BLOOD PRESSURE: 113 MMHG | HEART RATE: 63 BPM | DIASTOLIC BLOOD PRESSURE: 74 MMHG

## 2023-09-15 DIAGNOSIS — R51.9 HEADACHE, UNSPECIFIED: ICD-10-CM

## 2023-09-15 PROCEDURE — 99214 OFFICE O/P EST MOD 30 MIN: CPT

## 2023-09-15 RX ORDER — RIZATRIPTAN BENZOATE 10 MG/1
10 TABLET ORAL
Qty: 9 | Refills: 2 | Status: ACTIVE | COMMUNITY
Start: 2023-09-15 | End: 1900-01-01

## 2023-09-27 ENCOUNTER — APPOINTMENT (OUTPATIENT)
Dept: DERMATOLOGY | Facility: CLINIC | Age: 43
End: 2023-09-27
Payer: MEDICAID

## 2023-09-27 DIAGNOSIS — L72.9 FOLLICULAR CYST OF THE SKIN AND SUBCUTANEOUS TISSUE, UNSPECIFIED: ICD-10-CM

## 2023-09-27 DIAGNOSIS — D22.9 MELANOCYTIC NEVI, UNSPECIFIED: ICD-10-CM

## 2023-09-27 DIAGNOSIS — Z12.83 ENCOUNTER FOR SCREENING FOR MALIGNANT NEOPLASM OF SKIN: ICD-10-CM

## 2023-09-27 DIAGNOSIS — B08.1 MOLLUSCUM CONTAGIOSUM: ICD-10-CM

## 2023-09-27 DIAGNOSIS — L30.9 DERMATITIS, UNSPECIFIED: ICD-10-CM

## 2023-09-27 DIAGNOSIS — L71.9 ROSACEA, UNSPECIFIED: ICD-10-CM

## 2023-09-27 DIAGNOSIS — L21.9 SEBORRHEIC DERMATITIS, UNSPECIFIED: ICD-10-CM

## 2023-09-27 PROCEDURE — 99204 OFFICE O/P NEW MOD 45 MIN: CPT | Mod: 25

## 2023-09-27 PROCEDURE — 17110 DESTRUCTION B9 LES UP TO 14: CPT

## 2023-09-27 RX ORDER — KETOCONAZOLE 20.5 MG/ML
2 SHAMPOO, SUSPENSION TOPICAL
Qty: 1 | Refills: 11 | Status: ACTIVE | COMMUNITY
Start: 2023-09-27 | End: 1900-01-01

## 2023-09-27 RX ORDER — FLUOCINONIDE 0.5 MG/ML
0.05 SOLUTION TOPICAL
Qty: 1 | Refills: 2 | Status: ACTIVE | COMMUNITY
Start: 2023-09-27 | End: 1900-01-01

## 2023-09-27 RX ORDER — TACROLIMUS 1 MG/G
0.1 OINTMENT TOPICAL
Qty: 1 | Refills: 5 | Status: ACTIVE | COMMUNITY
Start: 2023-09-27 | End: 1900-01-01

## 2023-09-27 RX ORDER — METRONIDAZOLE 7.5 MG/G
0.75 CREAM TOPICAL
Qty: 1 | Refills: 5 | Status: ACTIVE | COMMUNITY
Start: 2023-09-27 | End: 1900-01-01

## 2023-10-05 ENCOUNTER — APPOINTMENT (OUTPATIENT)
Dept: DERMATOLOGY | Facility: CLINIC | Age: 43
End: 2023-10-05

## 2023-10-12 ENCOUNTER — NON-APPOINTMENT (OUTPATIENT)
Age: 43
End: 2023-10-12

## 2023-11-13 ENCOUNTER — APPOINTMENT (OUTPATIENT)
Dept: INTERNAL MEDICINE | Facility: CLINIC | Age: 43
End: 2023-11-13
Payer: MEDICAID

## 2023-11-13 PROCEDURE — 90746 HEPB VACCINE 3 DOSE ADULT IM: CPT

## 2023-11-13 PROCEDURE — G0010: CPT

## 2023-11-28 ENCOUNTER — APPOINTMENT (OUTPATIENT)
Dept: DERMATOLOGY | Facility: CLINIC | Age: 43
End: 2023-11-28

## 2024-01-04 ENCOUNTER — APPOINTMENT (OUTPATIENT)
Dept: INTERNAL MEDICINE | Facility: CLINIC | Age: 44
End: 2024-01-04
Payer: MEDICAID

## 2024-01-04 ENCOUNTER — MED ADMIN CHARGE (OUTPATIENT)
Age: 44
End: 2024-01-04

## 2024-01-04 PROCEDURE — G0010: CPT

## 2024-01-04 PROCEDURE — 90746 HEPB VACCINE 3 DOSE ADULT IM: CPT

## 2024-03-01 DIAGNOSIS — E78.5 HYPERLIPIDEMIA, UNSPECIFIED: ICD-10-CM

## 2024-03-01 DIAGNOSIS — R73.03 PREDIABETES.: ICD-10-CM

## 2024-03-07 DIAGNOSIS — Z01.84 ENCOUNTER FOR ANTIBODY RESPONSE EXAMINATION: ICD-10-CM

## 2024-03-08 ENCOUNTER — APPOINTMENT (OUTPATIENT)
Dept: INTERNAL MEDICINE | Facility: CLINIC | Age: 44
End: 2024-03-08
Payer: MEDICAID

## 2024-03-08 PROCEDURE — 36415 COLL VENOUS BLD VENIPUNCTURE: CPT

## 2024-03-12 ENCOUNTER — TRANSCRIPTION ENCOUNTER (OUTPATIENT)
Age: 44
End: 2024-03-12

## 2024-03-12 LAB — HBV SURFACE AB SERPL IA-ACNC: <3 MIU/ML

## 2024-05-06 ENCOUNTER — APPOINTMENT (OUTPATIENT)
Dept: PAIN MANAGEMENT | Facility: CLINIC | Age: 44
End: 2024-05-06

## 2024-05-10 NOTE — H&P PST ADULT - CIGARETTE, PACK YRS
Chief Complaint  Diabetes (New pt, est care, med mgt, A1C completed 5/8/24, would like to talk about getting a pump and cgm )    Referred By: MADHURI Caal presents to [facility] for diabetes medication management    History of Present Illness    Visit type:  to establish care  Diabetes type:  Type 2  Age at time of dx/Year of dx/Number of years:  12-13 years  Family History of Diabetes: Paternal aunt, maternal aunt, and cousin  Current diabetes status/concerns/issues: She has been referred to our office by her primary care provider for assistance in managing her diabetes.  The patient has some interest in a possible insulin pump and continuous glucose sensor.  She is needing to get her A1c under control because she is needing to have a total hip replacement on the right side.  Other current health concerns: She has had an abscess in her hip and has had repeated surgery.  She states that she has had deterioration of the hip and the muscle surrounding the area.  Current Diabetes symptoms:    Polyuria: Yes     Polydipsia: Yes she drinks a lot of water   Polyphagia: No   Blurred vision: Yes she states the blurriness comes and goes   Excessive fatigue: Yes    Known Diabetes complications:  Neuropathy: Tingling, Pain, Burning, and Shooting Pain; Location: Feet and Hands  Renal: None  Eyes: No current eye exam available in record; Location: N/A; Last Eye Exam:  2 years ago ; Location: [facility]  Amputation/Wounds:  She has prior history of a wound to the right foot which is completely healed as well and has extensive wound to the posterior left thigh which is now healed; no current wounds  GI: Indigestion and Nausea  Cardiovascular: Hypertension and Hyperlipidemia  ED: N/A  Other: None  Hospitalizations/ED/911 secondary to DM?  Yes, she has had prior hospitalizations basically due to wounds that were brought on because  of her diabetes  Hypoglycemia:  None reported at this time  Hypoglycemia Symptoms:  No hypoglycemia at this time  Current Diabetes treatment:  Lantus 45 units every evening (she was previously taking 33 units of Lantus and then was advised to increase to 43 units and then she supposed to go to 45 units tonight), metformin 1000 mg twice a day  Prior diabetes treatments: Ozempic which caused vomiting  Using ACEI or ARB: Yes, losartan, Managed by other provider  Using Statin: Yes, rosuvastatin, Managed by other provider  Blood glucose device:  Meter  Blood glucose monitoring frequency:  2  Blood glucose range/average:   She reports her fasting glucose levels are currently running 160-180; her afternoon glucose levels are somewhat the same  Glucose Source: Patient Reported  Dietary behavior:  Number of meals each day - 1-3; Number of snacks each day - she does snack on some fruit, she does drink some lemon lime Jaquan-Aid but she states that she only sweeteners it with about 1/4 cup of sugar otherwise she drinks a lot of water  Activity/Exercise:   She is currently unable to bear weight on her right hip  Last Foot Exam:  She is seen by podiatry services  Diabetes Education Hx: Comprehensive Diabetes Education and Diabetes Nutrition Counseling  Social Determinants of Health: Transportation; she has been unable to drive due to her hip issues.  Her mother provides transportation    Past Medical History:   Diagnosis Date    Anxiety     B12 deficiency 07/11/2023    Depression     Diabetes mellitus     Essential hypertension 07/11/2023    Folate deficiency 05/05/2024    Hyperlipidemia 05/05/2024    PTSD (post-traumatic stress disorder)     Vitamin D deficiency 06/16/2023     Past Surgical History:   Procedure Laterality Date    HIP SURGERY      HIP SURGERY Left     HYSTERECTOMY      2013     Family History   Problem Relation Age of Onset    Diabetes Paternal Aunt     Diabetes Cousin     Diabetes Maternal Aunt      Social  History     Socioeconomic History    Marital status:    Tobacco Use    Smoking status: Every Day     Current packs/day: 0.25     Types: Cigarettes    Smokeless tobacco: Current   Vaping Use    Vaping status: Never Used   Substance and Sexual Activity    Alcohol use: Never    Drug use: Yes     Types: Marijuana    Sexual activity: Defer     Allergies   Allergen Reactions    Latex Other (See Comments)    Adhesive Tape Itching and Rash       Current Outpatient Medications:     albuterol sulfate  (90 Base) MCG/ACT inhaler, Inhale 2 puffs 2 (Two) Times a Day. Use with space chamber., Disp: 18 g, Rfl: 5    baclofen (LIORESAL) 20 MG tablet, Take 1 tablet by mouth 3 (Three) Times a Day., Disp: 90 tablet, Rfl: 0    BLACK COHOSH PO, Take  by mouth., Disp: , Rfl:     desvenlafaxine (Pristiq) 50 MG 24 hr tablet, Take 1 tablet by mouth Daily., Disp: 90 tablet, Rfl: 0    Fluticasone-Salmeterol (ADVAIR/WIXELA) 250-50 MCG/ACT DISKUS, Inhale 1 puff 2 (Two) Times a Day., Disp: 60 each, Rfl: 5    folic acid (FOLVITE) 1 MG tablet, Take 1 tablet by mouth Daily., Disp: 90 tablet, Rfl: 3    Insulin Glargine (LANTUS SOLOSTAR) 100 UNIT/ML injection pen, Inject 35 Units under the skin into the appropriate area as directed Every Night for 180 days. Take 25 units of Lantus twice a day approximately 12 hours apart; increase the dose as directed to a maximum dose of 35 units twice a day, Disp: 32 mL, Rfl: 1    losartan (Cozaar) 25 MG tablet, Take 1 tablet by mouth Daily., Disp: 90 tablet, Rfl: 3    metFORMIN (FORTAMET) 1000 MG (OSM) 24 hr tablet, Take 1 tablet by mouth 2 (Two) Times a Day With Meals., Disp: 180 tablet, Rfl: 3    nicotine (NICODERM CQ) 7 MG/24HR patch, Place 1 patch on the skin as directed by provider Daily., Disp: 30 patch, Rfl: 1    ondansetron ODT (ZOFRAN-ODT) 4 MG disintegrating tablet, Place 1 tablet on the tongue Every 8 (Eight) Hours As Needed for Nausea or Vomiting., Disp: 30 tablet, Rfl: 5    rosuvastatin  "(Crestor) 5 MG tablet, Take 1 tablet by mouth Daily., Disp: 90 tablet, Rfl: 1    Spacer/Aero-Holding Chambers (Compact Space Chamber) device, 1 applicator Daily., Disp: 1 each, Rfl: 1    vitamin C (ASCORBIC ACID) 250 MG tablet, Take 1 tablet by mouth Daily., Disp: , Rfl:     vitamin D3 125 MCG (5000 UT) capsule capsule, Take  by mouth., Disp: , Rfl:     Continuous Glucose Sensor (Dexcom G7 Sensor) misc, Use 1 each Every 10 (Ten) Days., Disp: 3 each, Rfl: 5    Insulin Lispro, 1 Unit Dial, (HumaLOG KwikPen) 100 UNIT/ML solution pen-injector, Inject 20 Units under the skin into the appropriate area as directed 3 (Three) Times a Day Before Meals for 180 days. Adjust dose as directed based on meal size and glucose level up to 20 units 3 times a day, Disp: 54 mL, Rfl: 1    Insulin Pen Needle (Pen Needles) 32G X 4 MM misc, Use 1 each 5 (Five) Times a Day for 180 days., Disp: 450 each, Rfl: 1    Objective     Vitals:    05/10/24 1309   BP: 128/83   Pulse: 116   SpO2: 98%   Weight: 92.5 kg (204 lb)   Height: 170.2 cm (67\")   PainSc:   7     Body mass index is 31.95 kg/m².    Physical Exam  Constitutional:       Appearance: Normal appearance. She is obese.      Comments: Obesity (BMI 30 - 39.9) Pt Current BMI = 31.95    HENT:      Head: Normocephalic and atraumatic.      Right Ear: External ear normal.      Left Ear: External ear normal.      Nose: Nose normal.   Eyes:      Extraocular Movements: Extraocular movements intact.      Conjunctiva/sclera: Conjunctivae normal.   Pulmonary:      Effort: Pulmonary effort is normal.   Musculoskeletal:         General: Normal range of motion.      Cervical back: Normal range of motion.   Skin:     General: Skin is warm and dry.   Neurological:      General: No focal deficit present.      Mental Status: She is alert and oriented to person, place, and time. Mental status is at baseline.   Psychiatric:         Mood and Affect: Mood normal.         Behavior: Behavior normal.         " Thought Content: Thought content normal.         Judgment: Judgment normal.             Result Review :   The following data was reviewed by: MADHURI Mansfield on 05/10/2024:      A1c collected on 5/8/2024  is 10%, indicating Uncontrolled Type II diabetes.  This result is down from the prior result of 10.4% collected on 1/31/2024            Assessment: Patient's A1c is significantly elevated.  She is struggling with significant issues in her right hip with frequent prior abscesses.  She is needing a total hip replacement but her surgeon requires her A1c to be under control before taking her to surgery because she is very high risk for recurrent infection.  She is currently unable to bear weight on this hip.  She is interested in an insulin pump to help facilitate better glycemic control.      Diagnoses and all orders for this visit:    1. Uncontrolled type 2 diabetes mellitus with hyperglycemia (Primary)  -     Insulin Glargine (LANTUS SOLOSTAR) 100 UNIT/ML injection pen; Inject 35 Units under the skin into the appropriate area as directed Every Night for 180 days. Take 25 units of Lantus twice a day approximately 12 hours apart; increase the dose as directed to a maximum dose of 35 units twice a day  Dispense: 32 mL; Refill: 1  -     Insulin Lispro, 1 Unit Dial, (HumaLOG KwikPen) 100 UNIT/ML solution pen-injector; Inject 20 Units under the skin into the appropriate area as directed 3 (Three) Times a Day Before Meals for 180 days. Adjust dose as directed based on meal size and glucose level up to 20 units 3 times a day  Dispense: 54 mL; Refill: 1  -     Insulin Pen Needle (Pen Needles) 32G X 4 MM misc; Use 1 each 5 (Five) Times a Day for 180 days.  Dispense: 450 each; Refill: 1  -     Continuous Glucose  (Dexcom G7 ) device; Use 1 each 1 (One) Time for 1 dose. Use for daily glucose monitoring  Dispense: 1 each; Refill: 0  -     Continuous Glucose Sensor (Dexcom G7 Sensor) misc; Use 1 each  Every 10 (Ten) Days.  Dispense: 3 each; Refill: 5    2. Type 2 diabetes mellitus with diabetic neuropathy, with long-term current use of insulin    3. Obesity (BMI 30-39.9)        Plan: We will process the patient for tandem insulin pump with a Dexcom continuous glucose sensor.  Upon receiving these devices the patient will call the office to schedule appointment with the diabetes nurse educator for training.  In the meantime the patient will change her Lantus to taking 25 units twice a day 12 hours apart.  She will continue taking her metformin.  We will add Humalog taking 5, 7, or 9 units before meals based on small, medium, or large amounts of carbohydrates.  Additionally, she will add correction for glucose levels greater than 150 using a 1-25 correction ratio.  Should she develop any hypoglycemia with these changes in her treatment plan she will call the office.    The patient will monitor her blood glucose levels using the CGM.  If she develops problematic hyperglycemia or hypoglycemia or adverse drug reactions, she will contact the office for further instructions.        Follow Up     Return in about 6 weeks (around 6/21/2024) for Pump Eval, CGM Follow-up.    Patient was given instructions and counseling regarding her condition or for health maintenance advice. Please see specific information pulled into the AVS if appropriate.     Jen Guthrie, APRN  05/10/2024      Dictated Utilizing Dragon Dictation.  Please note that portions of this note were completed with a voice recognition program.  Part of this note may be an electronic transcription/translation of spoken language to printed text using the Dragon Dictation System.   4

## 2024-08-06 ENCOUNTER — NON-APPOINTMENT (OUTPATIENT)
Age: 44
End: 2024-08-06

## 2024-08-27 ENCOUNTER — NON-APPOINTMENT (OUTPATIENT)
Age: 44
End: 2024-08-27

## 2024-11-22 ENCOUNTER — NON-APPOINTMENT (OUTPATIENT)
Age: 44
End: 2024-11-22

## 2025-02-20 ENCOUNTER — APPOINTMENT (OUTPATIENT)
Age: 45
End: 2025-02-20

## 2025-02-27 ENCOUNTER — APPOINTMENT (OUTPATIENT)
Age: 45
End: 2025-02-27

## 2025-04-22 ENCOUNTER — NON-APPOINTMENT (OUTPATIENT)
Age: 45
End: 2025-04-22

## 2025-04-24 ENCOUNTER — APPOINTMENT (OUTPATIENT)
Dept: FAMILY MEDICINE | Facility: CLINIC | Age: 45
End: 2025-04-24
Payer: MEDICAID

## 2025-04-24 VITALS
HEART RATE: 75 BPM | BODY MASS INDEX: 35.49 KG/M2 | DIASTOLIC BLOOD PRESSURE: 80 MMHG | OXYGEN SATURATION: 98 % | HEIGHT: 65 IN | RESPIRATION RATE: 12 BRPM | TEMPERATURE: 209.12 F | SYSTOLIC BLOOD PRESSURE: 137 MMHG | WEIGHT: 213 LBS

## 2025-04-24 VITALS — SYSTOLIC BLOOD PRESSURE: 131 MMHG | DIASTOLIC BLOOD PRESSURE: 71 MMHG

## 2025-04-24 DIAGNOSIS — E78.5 HYPERLIPIDEMIA, UNSPECIFIED: ICD-10-CM

## 2025-04-24 DIAGNOSIS — E66.9 OBESITY, UNSPECIFIED: ICD-10-CM

## 2025-04-24 DIAGNOSIS — R73.03 PREDIABETES.: ICD-10-CM

## 2025-04-24 DIAGNOSIS — L21.0 SEBORRHEA CAPITIS: ICD-10-CM

## 2025-04-24 DIAGNOSIS — R21 RASH AND OTHER NONSPECIFIC SKIN ERUPTION: ICD-10-CM

## 2025-04-24 DIAGNOSIS — Z12.11 ENCOUNTER FOR SCREENING FOR MALIGNANT NEOPLASM OF COLON: ICD-10-CM

## 2025-04-24 DIAGNOSIS — E55.9 VITAMIN D DEFICIENCY, UNSPECIFIED: ICD-10-CM

## 2025-04-24 DIAGNOSIS — Z00.00 ENCOUNTER FOR GENERAL ADULT MEDICAL EXAMINATION W/OUT ABNORMAL FINDINGS: ICD-10-CM

## 2025-04-24 DIAGNOSIS — Z12.31 ENCOUNTER FOR SCREENING MAMMOGRAM FOR MALIGNANT NEOPLASM OF BREAST: ICD-10-CM

## 2025-04-24 DIAGNOSIS — I83.90 ASYMPTOMATIC VARICOSE VEINS OF UNSPECIFIED LOWER EXTREMITY: ICD-10-CM

## 2025-04-24 PROCEDURE — 99396 PREV VISIT EST AGE 40-64: CPT

## 2025-04-24 RX ORDER — KETOCONAZOLE 20 MG/ML
2 SHAMPOO TOPICAL DAILY
Qty: 1 | Refills: 0 | Status: ACTIVE | COMMUNITY
Start: 2025-04-24 | End: 1900-01-01

## 2025-05-01 LAB
25(OH)D3 SERPL-MCNC: 38.5 NG/ML
ALBUMIN SERPL ELPH-MCNC: 4.5 G/DL
ALP BLD-CCNC: 82 U/L
ALT SERPL-CCNC: 23 U/L
ANION GAP SERPL CALC-SCNC: 14 MMOL/L
AST SERPL-CCNC: 20 U/L
BASOPHILS # BLD AUTO: 0.04 K/UL
BASOPHILS NFR BLD AUTO: 0.6 %
BILIRUB SERPL-MCNC: 0.3 MG/DL
BUN SERPL-MCNC: 11 MG/DL
CALCIUM SERPL-MCNC: 9.7 MG/DL
CHLORIDE SERPL-SCNC: 103 MMOL/L
CHOLEST SERPL-MCNC: 186 MG/DL
CO2 SERPL-SCNC: 22 MMOL/L
CREAT SERPL-MCNC: 0.67 MG/DL
EGFRCR SERPLBLD CKD-EPI 2021: 110 ML/MIN/1.73M2
EOSINOPHIL # BLD AUTO: 0.13 K/UL
EOSINOPHIL NFR BLD AUTO: 1.8 %
ESTIMATED AVERAGE GLUCOSE: 120 MG/DL
GLUCOSE SERPL-MCNC: 104 MG/DL
HBA1C MFR BLD HPLC: 5.8 %
HCT VFR BLD CALC: 44.7 %
HDLC SERPL-MCNC: 41 MG/DL
HGB BLD-MCNC: 14.2 G/DL
IMM GRANULOCYTES NFR BLD AUTO: 0.3 %
LDLC SERPL-MCNC: 129 MG/DL
LYMPHOCYTES # BLD AUTO: 2.27 K/UL
LYMPHOCYTES NFR BLD AUTO: 31.7 %
MAN DIFF?: NORMAL
MCHC RBC-ENTMCNC: 27.8 PG
MCHC RBC-ENTMCNC: 31.8 G/DL
MCV RBC AUTO: 87.6 FL
MONOCYTES # BLD AUTO: 0.44 K/UL
MONOCYTES NFR BLD AUTO: 6.1 %
NEUTROPHILS # BLD AUTO: 4.26 K/UL
NEUTROPHILS NFR BLD AUTO: 59.5 %
NONHDLC SERPL-MCNC: 145 MG/DL
PLATELET # BLD AUTO: 271 K/UL
POTASSIUM SERPL-SCNC: 4.5 MMOL/L
PROT SERPL-MCNC: 6.8 G/DL
RBC # BLD: 5.1 M/UL
RBC # FLD: 14.3 %
SODIUM SERPL-SCNC: 138 MMOL/L
TRIGL SERPL-MCNC: 91 MG/DL
TSH SERPL-ACNC: 1.84 UIU/ML
WBC # FLD AUTO: 7.16 K/UL

## 2025-05-02 LAB — ANA SER IF-ACNC: NEGATIVE

## 2025-05-05 ENCOUNTER — TRANSCRIPTION ENCOUNTER (OUTPATIENT)
Age: 45
End: 2025-05-05

## 2025-05-06 ENCOUNTER — APPOINTMENT (OUTPATIENT)
Dept: VASCULAR SURGERY | Facility: CLINIC | Age: 45
End: 2025-05-06
Payer: MEDICAID

## 2025-05-06 VITALS
HEIGHT: 65 IN | DIASTOLIC BLOOD PRESSURE: 82 MMHG | BODY MASS INDEX: 35.16 KG/M2 | SYSTOLIC BLOOD PRESSURE: 129 MMHG | OXYGEN SATURATION: 98 % | TEMPERATURE: 208.76 F | WEIGHT: 211 LBS

## 2025-05-06 PROCEDURE — 99203 OFFICE O/P NEW LOW 30 MIN: CPT

## 2025-05-06 PROCEDURE — 93970 EXTREMITY STUDY: CPT

## 2025-05-08 ENCOUNTER — RESULT REVIEW (OUTPATIENT)
Age: 45
End: 2025-05-08

## 2025-05-08 ENCOUNTER — APPOINTMENT (OUTPATIENT)
Dept: ULTRASOUND IMAGING | Facility: CLINIC | Age: 45
End: 2025-05-08
Payer: MEDICAID

## 2025-05-08 ENCOUNTER — APPOINTMENT (OUTPATIENT)
Dept: MAMMOGRAPHY | Facility: CLINIC | Age: 45
End: 2025-05-08
Payer: MEDICAID

## 2025-05-08 PROCEDURE — 77063 BREAST TOMOSYNTHESIS BI: CPT

## 2025-05-08 PROCEDURE — 77067 SCR MAMMO BI INCL CAD: CPT

## 2025-05-08 PROCEDURE — 76641 ULTRASOUND BREAST COMPLETE: CPT | Mod: 50

## 2025-05-12 ENCOUNTER — APPOINTMENT (OUTPATIENT)
Dept: PEDIATRIC ALLERGY IMMUNOLOGY | Facility: CLINIC | Age: 45
End: 2025-05-12

## 2025-05-15 ENCOUNTER — RESULT REVIEW (OUTPATIENT)
Age: 45
End: 2025-05-15

## 2025-05-15 ENCOUNTER — OUTPATIENT (OUTPATIENT)
Dept: OUTPATIENT SERVICES | Facility: HOSPITAL | Age: 45
LOS: 1 days | End: 2025-05-15
Payer: MEDICAID

## 2025-05-15 ENCOUNTER — APPOINTMENT (OUTPATIENT)
Dept: ULTRASOUND IMAGING | Facility: IMAGING CENTER | Age: 45
End: 2025-05-15
Payer: MEDICAID

## 2025-05-15 ENCOUNTER — APPOINTMENT (OUTPATIENT)
Dept: MAMMOGRAPHY | Facility: IMAGING CENTER | Age: 45
End: 2025-05-15
Payer: MEDICAID

## 2025-05-15 DIAGNOSIS — Z87.2 PERSONAL HISTORY OF DISEASES OF THE SKIN AND SUBCUTANEOUS TISSUE: Chronic | ICD-10-CM

## 2025-05-15 DIAGNOSIS — Z00.8 ENCOUNTER FOR OTHER GENERAL EXAMINATION: ICD-10-CM

## 2025-05-15 PROCEDURE — 77065 DX MAMMO INCL CAD UNI: CPT | Mod: 26,RT

## 2025-05-15 PROCEDURE — 76642 ULTRASOUND BREAST LIMITED: CPT

## 2025-05-15 PROCEDURE — 76642 ULTRASOUND BREAST LIMITED: CPT | Mod: 26,RT

## 2025-05-15 PROCEDURE — G0279: CPT

## 2025-05-15 PROCEDURE — 77065 DX MAMMO INCL CAD UNI: CPT

## 2025-05-15 PROCEDURE — G0279: CPT | Mod: 26

## 2025-06-11 ENCOUNTER — APPOINTMENT (OUTPATIENT)
Dept: GASTROENTEROLOGY | Facility: CLINIC | Age: 45
End: 2025-06-11
Payer: MEDICAID

## 2025-06-11 VITALS
RESPIRATION RATE: 14 BRPM | HEIGHT: 65 IN | BODY MASS INDEX: 34.99 KG/M2 | DIASTOLIC BLOOD PRESSURE: 70 MMHG | TEMPERATURE: 97.8 F | HEART RATE: 76 BPM | OXYGEN SATURATION: 96 % | SYSTOLIC BLOOD PRESSURE: 110 MMHG | WEIGHT: 210 LBS

## 2025-06-11 PROBLEM — Z83.79 FAMILY HISTORY OF IRRITABLE BOWEL SYNDROME: Status: ACTIVE | Noted: 2025-06-11

## 2025-06-11 PROBLEM — Z80.42 FAMILY HISTORY OF PROSTATE CANCER: Status: ACTIVE | Noted: 2025-06-11

## 2025-06-11 PROBLEM — R19.7 DIARRHEA: Status: ACTIVE | Noted: 2025-06-11

## 2025-06-11 PROCEDURE — G2211 COMPLEX E/M VISIT ADD ON: CPT | Mod: NC

## 2025-06-11 PROCEDURE — 99204 OFFICE O/P NEW MOD 45 MIN: CPT

## 2025-06-11 RX ORDER — SODIUM SULFATE, POTASSIUM SULFATE AND MAGNESIUM SULFATE 1.6; 3.13; 17.5 G/177ML; G/177ML; G/177ML
17.5-3.13-1.6 SOLUTION ORAL
Qty: 1 | Refills: 0 | Status: ACTIVE | COMMUNITY
Start: 2025-06-11 | End: 1900-01-01

## 2025-07-16 ENCOUNTER — NON-APPOINTMENT (OUTPATIENT)
Age: 45
End: 2025-07-16

## 2025-07-21 ENCOUNTER — APPOINTMENT (OUTPATIENT)
Dept: GASTROENTEROLOGY | Facility: HOSPITAL | Age: 45
End: 2025-07-21

## 2025-07-31 ENCOUNTER — OUTPATIENT (OUTPATIENT)
Dept: OUTPATIENT SERVICES | Facility: HOSPITAL | Age: 45
LOS: 1 days | End: 2025-07-31
Payer: MEDICAID

## 2025-07-31 ENCOUNTER — APPOINTMENT (OUTPATIENT)
Dept: FAMILY MEDICINE | Facility: CLINIC | Age: 45
End: 2025-07-31
Payer: MEDICAID

## 2025-07-31 ENCOUNTER — APPOINTMENT (OUTPATIENT)
Dept: RADIOLOGY | Facility: HOSPITAL | Age: 45
End: 2025-07-31

## 2025-07-31 ENCOUNTER — RESULT REVIEW (OUTPATIENT)
Age: 45
End: 2025-07-31

## 2025-07-31 VITALS
OXYGEN SATURATION: 99 % | RESPIRATION RATE: 14 BRPM | DIASTOLIC BLOOD PRESSURE: 78 MMHG | HEART RATE: 85 BPM | BODY MASS INDEX: 35.45 KG/M2 | TEMPERATURE: 98.6 F | WEIGHT: 213 LBS | SYSTOLIC BLOOD PRESSURE: 114 MMHG

## 2025-07-31 DIAGNOSIS — S16.1XXA STRAIN OF MUSCLE, FASCIA AND TENDON AT NECK LEVEL, INITIAL ENCOUNTER: ICD-10-CM

## 2025-07-31 DIAGNOSIS — Z87.2 PERSONAL HISTORY OF DISEASES OF THE SKIN AND SUBCUTANEOUS TISSUE: ICD-10-CM

## 2025-07-31 DIAGNOSIS — Z87.2 PERSONAL HISTORY OF DISEASES OF THE SKIN AND SUBCUTANEOUS TISSUE: Chronic | ICD-10-CM

## 2025-07-31 DIAGNOSIS — M76.60 ACHILLES TENDINITIS, UNSPECIFIED LEG: ICD-10-CM
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
## 2025-07-31 PROCEDURE — 72040 X-RAY EXAM NECK SPINE 2-3 VW: CPT | Mod: 26

## 2025-07-31 PROCEDURE — 72040 X-RAY EXAM NECK SPINE 2-3 VW: CPT

## 2025-07-31 PROCEDURE — 99214 OFFICE O/P EST MOD 30 MIN: CPT

## 2025-07-31 PROCEDURE — G2211 COMPLEX E/M VISIT ADD ON: CPT | Mod: NC

## 2025-07-31 RX ORDER — TIZANIDINE 4 MG/1
4 TABLET ORAL EVERY 8 HOURS
Qty: 15 | Refills: 0 | Status: ACTIVE | COMMUNITY
Start: 2025-07-31 | End: 1900-01-01

## 2025-07-31 RX ORDER — NAPROXEN 500 MG/1
500 TABLET ORAL
Qty: 14 | Refills: 0 | Status: ACTIVE | COMMUNITY
Start: 2025-07-31 | End: 1900-01-01

## 2025-08-14 ENCOUNTER — APPOINTMENT (OUTPATIENT)
Dept: PHYSICAL MEDICINE AND REHAB | Facility: CLINIC | Age: 45
End: 2025-08-14
Payer: COMMERCIAL

## 2025-08-14 DIAGNOSIS — R51.9 HEADACHE, UNSPECIFIED: ICD-10-CM

## 2025-08-14 DIAGNOSIS — M54.2 CERVICALGIA: ICD-10-CM

## 2025-08-14 DIAGNOSIS — S09.90XA UNSPECIFIED INJURY OF HEAD, INITIAL ENCOUNTER: ICD-10-CM

## 2025-08-14 PROCEDURE — 99244 OFF/OP CNSLTJ NEW/EST MOD 40: CPT

## 2025-08-14 RX ORDER — GABAPENTIN 100 MG/1
100 CAPSULE ORAL 3 TIMES DAILY
Qty: 90 | Refills: 0 | Status: ACTIVE | COMMUNITY
Start: 2025-08-14 | End: 1900-01-01

## 2025-08-19 ENCOUNTER — NON-APPOINTMENT (OUTPATIENT)
Age: 45
End: 2025-08-19

## 2025-08-22 ENCOUNTER — NON-APPOINTMENT (OUTPATIENT)
Age: 45
End: 2025-08-22

## 2025-09-08 ENCOUNTER — LABORATORY RESULT (OUTPATIENT)
Age: 45
End: 2025-09-08

## 2025-09-08 ENCOUNTER — APPOINTMENT (OUTPATIENT)
Dept: PEDIATRIC ALLERGY IMMUNOLOGY | Facility: CLINIC | Age: 45
End: 2025-09-08

## 2025-09-08 VITALS — BODY MASS INDEX: 35.91 KG/M2 | OXYGEN SATURATION: 96 % | WEIGHT: 215.8 LBS | HEART RATE: 90 BPM

## 2025-09-08 DIAGNOSIS — Z83.6 FAMILY HISTORY OF OTHER DISEASES OF THE RESPIRATORY SYSTEM: ICD-10-CM

## 2025-09-08 DIAGNOSIS — T78.1XXA OTHER ADVERSE FOOD REACTIONS, NOT ELSEWHERE CLASSIFIED, INITIAL ENCOUNTER: ICD-10-CM

## 2025-09-08 DIAGNOSIS — Z91.018 ALLERGY TO OTHER FOODS: ICD-10-CM

## 2025-09-08 DIAGNOSIS — L71.9 ROSACEA, UNSPECIFIED: ICD-10-CM

## 2025-09-08 DIAGNOSIS — H10.10 ACUTE ATOPIC CONJUNCTIVITIS, UNSPECIFIED EYE: ICD-10-CM

## 2025-09-08 DIAGNOSIS — Z87.2 PERSONAL HISTORY OF DISEASES OF THE SKIN AND SUBCUTANEOUS TISSUE: ICD-10-CM

## 2025-09-08 DIAGNOSIS — J30.1 ALLERGIC RHINITIS DUE TO POLLEN: ICD-10-CM

## 2025-09-08 LAB
IGA SERPL-MCNC: 157 MG/DL
TTG IGA SER IA-ACNC: <0.5 U/ML
TTG IGA SER-ACNC: NEGATIVE
TTG IGG SER IA-ACNC: <0.8 U/ML
TTG IGG SER IA-ACNC: NEGATIVE

## 2025-09-08 PROCEDURE — 99204 OFFICE O/P NEW MOD 45 MIN: CPT | Mod: 25

## 2025-09-08 RX ORDER — AZELASTINE HYDROCHLORIDE 137 UG/1
137 SPRAY, METERED NASAL
Qty: 1 | Refills: 2 | Status: ACTIVE | COMMUNITY
Start: 2025-09-08 | End: 1900-01-01

## 2025-09-08 RX ORDER — KETOTIFEN FUMARATE 0.25 MG/ML
0.04 SOLUTION OPHTHALMIC
Qty: 2 | Refills: 5 | Status: ACTIVE | COMMUNITY
Start: 2025-09-08 | End: 1900-01-01

## 2025-09-08 RX ORDER — FEXOFENADINE HCL 180 MG/1
180 TABLET, FILM COATED ORAL DAILY
Qty: 1 | Refills: 5 | Status: ACTIVE | COMMUNITY
Start: 2025-09-08 | End: 1900-01-01

## 2025-09-08 RX ORDER — EPINEPHRINE 0.3 MG/.3ML
0.3 INJECTION INTRAMUSCULAR
Qty: 1 | Refills: 0 | Status: ACTIVE | COMMUNITY
Start: 2025-09-08 | End: 1900-01-01

## 2025-09-10 LAB
A ALTERNATA IGE QN: <0.1 KUA/L
A FUMIGATUS IGE QN: <0.1 KUA/L
AMER BEECH IGE QN: 0
BERMUDA GRASS IGE QN: <0.1 KUA/L
BOXELDER IGE QN: <0.1 KUA/L
BRAZIL NUT IGE QN: <0.1 KUA/L
C HERBARUM IGE QN: <0.1 KUA/L
CALIF WALNUT IGE QN: <0.1 KUA/L
CASHEW NUT IGE QN: <0.1 KUA/L
CAT DANDER IGE QN: <0.1 KUA/L
CMN PIGWEED IGE QN: <0.1 KUA/L
COCKLEBUR IGE QN: <0.1 KUA/L
COCKSFOOT IGE QN: <0.1 KUA/L
COMMON RAGWEED IGE QN: <0.1 KUA/L
COTTONWOOD IGE QN: <0.1 KUA/L
CRAB IGE QN: <0.1 KUA/L
D FARINAE IGE QN: <0.1 KUA/L
D PTERONYSS IGE QN: <0.1 KUA/L
DEPRECATED A ALTERNATA IGE RAST QL: 0
DEPRECATED A FUMIGATUS IGE RAST QL: 0
DEPRECATED A PULLULANS IGE RAST QL: 0
DEPRECATED AMER BEECH IGE RAST QL: <0.1 KUA/L
DEPRECATED BERMUDA GRASS IGE RAST QL: 0
DEPRECATED BOXELDER IGE RAST QL: 0
DEPRECATED BRAZIL NUT IGE RAST QL: 0
DEPRECATED C HERBARUM IGE RAST QL: 0
DEPRECATED CALIF WALNUT POLN IGE RAST QL: 0
DEPRECATED CASHEW NUT IGE RAST QL: 0
DEPRECATED CAT DANDER IGE RAST QL: 0
DEPRECATED COCKLEBUR IGE RAST QL: 0
DEPRECATED COCKSFOOT IGE RAST QL: 0
DEPRECATED COMMON PIGWEED IGE RAST QL: 0
DEPRECATED COMMON RAGWEED IGE RAST QL: 0
DEPRECATED COTTONWOOD IGE RAST QL: 0
DEPRECATED CRAB IGE RAST QL: 0
DEPRECATED D FARINAE IGE RAST QL: 0
DEPRECATED D PTERONYSS IGE RAST QL: 0
DEPRECATED DOG DANDER IGE RAST QL: 0
DEPRECATED ENGL PLANTAIN IGE RAST QL: 0
DEPRECATED F MONILIFORME IGE RAST QL: 0
DEPRECATED GIANT RAGWEED IGE RAST QL: 0
DEPRECATED GOOSE FEATHER IGE RAST QL: 0
DEPRECATED GOOSEFOOT IGE RAST QL: 0
DEPRECATED HAZELNUT IGE RAST QL: 0
DEPRECATED KENT BLUE GRASS IGE RAST QL: 0
DEPRECATED LOBSTER IGE RAST QL: 0
DEPRECATED LONDON PLANE IGE RAST QL: 0
DEPRECATED MACADAMIA IGE RAST QL: 0
DEPRECATED MEADOW FESCUE IGE RAST QL: 0
DEPRECATED MOUSE URINE PROT IGE RAST QL: 0
DEPRECATED MUGWORT IGE RAST QL: 0
DEPRECATED P NOTATUM IGE RAST QL: 0
DEPRECATED PEANUT IGE RAST QL: 0
DEPRECATED PISTACHIO IGE RAST QL: <0.1 KUA/L
DEPRECATED RED CEDAR IGE RAST QL: 0
DEPRECATED RED TOP GRASS IGE RAST QL: 0
DEPRECATED ROACH IGE RAST QL: 0
DEPRECATED RYE IGE RAST QL: 0
DEPRECATED SALTWORT IGE RAST QL: 0
DEPRECATED SHRIMP IGE RAST QL: 0
DEPRECATED SILVER BIRCH IGE RAST QL: 0
DEPRECATED SOYBEAN IGE RAST QL: 0
DEPRECATED SW VERNAL GRASS IGE RAST QL: 0
DEPRECATED TIMOTHY IGE RAST QL: 0
DEPRECATED TUNA IGE RAST QL: 0
DEPRECATED WALNUT IGE RAST QL: 0
DEPRECATED WHITE ASH IGE RAST QL: 0
DEPRECATED WHITE ELM IGE RAST QL: 0
DEPRECATED WHITE HICKORY IGE RAST QL: 0
DEPRECATED WHITE MULBERRY IGE RAST QL: 0
DEPRECATED WHITE OAK IGE RAST QL: 0
DOG DANDER IGE QN: <0.1 KUA/L
E ANA O3 STORAGE PROTEIN CASHEW (F443) CLASS: 0
E ANA O3 STORAGE PROTEIN CASHEW (F443) CONC: <0.1 KUA/L
ENGL PLANTAIN IGE QN: <0.1 KUA/L
F MONILIFORME IGE QN: <0.1 KUA/L
GIANT RAGWEED IGE QN: <0.1 KUA/L
GOOSE FEATHER IGE QN: <0.1 KUA/L
GOOSEFOOT IGE QN: <0.1 KUA/L
HAZELNUT IGE QN: <0.1 KUA/L
KENT BLUE GRASS IGE QN: <0.1 KUA/L
LOBSTER IGE QN: <0.1 KUA/L
LONDON PLANE IGE QN: <0.1 KUA/L
Lab: <0.1 KUA/L
MACADAMIA IGE QN: <0.1 KUA/L
MEADOW FESCUE IGE QN: <0.1 KUA/L
MOUSE URINE PROT IGE QN: <0.1 KUA/L
MT JUNIPER IGE QN: <0.1 KUA/L
MUGWORT IGE QN: <0.1 KUA/L
P NOTATUM IGE QN: <0.1 KUA/L
PEANUT (RARA H) 1 IGE QN: <0.1 KUA/L
PEANUT (RARA H) 2 IGE QN: <0.1 KUA/L
PEANUT (RARA H) 3 IGE QN: <0.1 KUA/L
PEANUT (RARA H) 6 IGE QN: <0.1 KUA/L
PEANUT (RARA H) 8 IGE QN: <0.1 KUA/L
PEANUT (RARA H) 9 IGE QN: <0.1 KUA/L
PEANUT IGE QN: <0.1 KUA/L
PISTACHIO IGE QN: 0
R COR A1 PR-10 HAZELNUT (F428) CLASS: 0
R COR A1 PR-10 HAZELNUT (F428) CONC: <0.1 KUA/L
R COR A14 HAZELNUT (F439) CLASS: 0
R COR A14 HAZELNUT (F439) CONC: <0.1 KUA/L
R COR A8 LTP HAZELNUT (F425) CLASS: 0
R COR A8 LTP HAZELNUT (F425) CONC: <0.1 KUA/L
R COR A9 HAZELNUT (F440) CLASS: 0
R COR A9 HAZELNUT (F440) CONC: <0.1 KUA/L
R JUG R1 STORAGE PROTEIN WALNUT (F441) CLASS: 0
R JUG R1 STORAGE PROTEIN WALNUT (F441) CONC: <0.1 KUA/L
R JUG R3 LPT WALNUT (F442) CLASS: 0
R JUG R3 LPT WALNUT (F442) CONC: <0.1 KUA/L
RARA H 6 STORAGE PROTEIN (F447) CLASS: 0
RARA H1 STORAGE PROTEIN (F422) CLASS: 0
RARA H2 STORAGE PROTEIN (F423) CLASS: 0
RARA H3 STORAGE PROTEIN (F424) CLASS: 0
RARA H8 PR-10 PROTEIN (F352) CLASS: 0
RARA H9 LIPID TRANSFERTP (F427) CLASS: 0
RED TOP GRASS IGE QN: <0.1 KUA/L
ROACH IGE QN: <0.1 KUA/L
RYE IGE QN: <0.1 KUA/L
SALTWORT IGE QN: <0.1 KUA/L
SHRIMP IGE QN: <0.1 KUA/L
SILVER BIRCH IGE QN: <0.1 KUA/L
SOY NGLY M5 BETA CONGLYCININ IGE F431 CLASS: 0
SOY NGLY M5 BETA CONGLYCININ IGE F431 CONC: <0.1 KUA/L
SOY NGLY M6 GLYCININ IGE F432 CLASS: 0
SOY NGLY M6 GLYCININ IGE F432 CONC: <0.1 KUA/L
SOY RGLY M4 PR-10 IGE F353 CLASS: 0
SOY RGLY M4 PR-10 IGE F353 CONC: <0.1 KUA/L
SOYBEAN IGE QN: <0.1 KUA/L
SW VERNAL GRASS IGE QN: <0.1 KUA/L
TIMOTHY IGE QN: <0.1 KUA/L
TRYPTASE: 6.4 UG/L
TUNA IGE QN: <0.1 KUA/L
WALNUT IGE QN: <0.1 KUA/L
WHITE ASH IGE QN: <0.1 KUA/L
WHITE ELM IGE QN: <0.1 KUA/L
WHITE HICKORY IGE QN: <0.1 KUA/L
WHITE MULBERRY IGE QN: <0.1 KUA/L
WHITE OAK IGE QN: <0.1 KUA/L

## 2025-09-11 LAB
C LUNATA IGE QN: <0.1 KUA/L
DEPRECATED C LUNATA IGE RAST QL: 0
DEPRECATED JOHNSON GRASS IGE RAST QL: 0
DEPRECATED PINE NUT IGE RAST QL: 0
JOHNSON GRASS IGE QN: <0.1 KUA/L
PINE NUT IGE QN: <0.1 KUA/L

## 2025-09-12 ENCOUNTER — NON-APPOINTMENT (OUTPATIENT)
Age: 45
End: 2025-09-12

## 2025-09-16 PROBLEM — T78.1XXA POLLEN-FOOD ALLERGY, INITIAL ENCOUNTER: Status: ACTIVE | Noted: 2025-09-16
